# Patient Record
Sex: FEMALE | Race: BLACK OR AFRICAN AMERICAN | NOT HISPANIC OR LATINO
[De-identification: names, ages, dates, MRNs, and addresses within clinical notes are randomized per-mention and may not be internally consistent; named-entity substitution may affect disease eponyms.]

---

## 2024-02-06 ENCOUNTER — ASOB RESULT (OUTPATIENT)
Age: 29
End: 2024-02-06

## 2024-02-06 ENCOUNTER — APPOINTMENT (OUTPATIENT)
Dept: ANTEPARTUM | Facility: CLINIC | Age: 29
End: 2024-02-06
Payer: COMMERCIAL

## 2024-02-06 PROCEDURE — 76805 OB US >/= 14 WKS SNGL FETUS: CPT

## 2024-04-05 ENCOUNTER — INPATIENT (INPATIENT)
Facility: HOSPITAL | Age: 29
LOS: 1 days | Discharge: HOME | End: 2024-04-07
Attending: OBSTETRICS & GYNECOLOGY | Admitting: SPECIALIST
Payer: COMMERCIAL

## 2024-04-05 ENCOUNTER — APPOINTMENT (OUTPATIENT)
Dept: ANTEPARTUM | Facility: CLINIC | Age: 29
End: 2024-04-05

## 2024-04-05 VITALS — RESPIRATION RATE: 16 BRPM | TEMPERATURE: 99 F

## 2024-04-05 DIAGNOSIS — O26.899 OTHER SPECIFIED PREGNANCY RELATED CONDITIONS, UNSPECIFIED TRIMESTER: ICD-10-CM

## 2024-04-05 LAB
BLD GP AB SCN SERPL QL: NEGATIVE — SIGNIFICANT CHANGE UP
HCT VFR BLD CALC: 34.5 % — SIGNIFICANT CHANGE UP (ref 34.5–45)
HGB BLD-MCNC: 11 G/DL — LOW (ref 11.5–15.5)
IANC: 24.01 K/UL — HIGH (ref 1.8–7.4)
MCHC RBC-ENTMCNC: 26.8 PG — LOW (ref 27–34)
MCHC RBC-ENTMCNC: 31.9 GM/DL — LOW (ref 32–36)
MCV RBC AUTO: 83.9 FL — SIGNIFICANT CHANGE UP (ref 80–100)
PLATELET # BLD AUTO: 200 K/UL — SIGNIFICANT CHANGE UP (ref 150–400)
RBC # BLD: 4.11 M/UL — SIGNIFICANT CHANGE UP (ref 3.8–5.2)
RBC # FLD: 15.1 % — HIGH (ref 10.3–14.5)
RH IG SCN BLD-IMP: NEGATIVE — SIGNIFICANT CHANGE UP
RH IG SCN BLD-IMP: NEGATIVE — SIGNIFICANT CHANGE UP
WBC # BLD: 26.96 K/UL — HIGH (ref 3.8–10.5)
WBC # FLD AUTO: 26.96 K/UL — HIGH (ref 3.8–10.5)

## 2024-04-05 PROCEDURE — 88307 TISSUE EXAM BY PATHOLOGIST: CPT | Mod: 26

## 2024-04-05 RX ORDER — INFLUENZA VIRUS VACCINE 15; 15; 15; 15 UG/.5ML; UG/.5ML; UG/.5ML; UG/.5ML
0.5 SUSPENSION INTRAMUSCULAR ONCE
Refills: 0 | Status: DISCONTINUED | OUTPATIENT
Start: 2024-04-05 | End: 2024-04-07

## 2024-04-05 RX ORDER — AMPICILLIN TRIHYDRATE 250 MG
2 CAPSULE ORAL ONCE
Refills: 0 | Status: DISCONTINUED | OUTPATIENT
Start: 2024-04-05 | End: 2024-04-06

## 2024-04-05 RX ORDER — AMPICILLIN TRIHYDRATE 250 MG
1 CAPSULE ORAL EVERY 4 HOURS
Refills: 0 | Status: DISCONTINUED | OUTPATIENT
Start: 2024-04-05 | End: 2024-04-06

## 2024-04-05 RX ORDER — CHLORHEXIDINE GLUCONATE 213 G/1000ML
1 SOLUTION TOPICAL DAILY
Refills: 0 | Status: DISCONTINUED | OUTPATIENT
Start: 2024-04-05 | End: 2024-04-06

## 2024-04-05 RX ORDER — SODIUM CHLORIDE 9 MG/ML
1000 INJECTION, SOLUTION INTRAVENOUS
Refills: 0 | Status: DISCONTINUED | OUTPATIENT
Start: 2024-04-05 | End: 2024-04-06

## 2024-04-05 RX ORDER — OXYTOCIN 10 UNIT/ML
333.33 VIAL (ML) INJECTION
Qty: 20 | Refills: 0 | Status: DISCONTINUED | OUTPATIENT
Start: 2024-04-05 | End: 2024-04-07

## 2024-04-05 NOTE — OB PROVIDER H&P - NSHPPHYSICALEXAM_GEN_ALL_CORE
Vital Signs Last 24 Hrs  T(C): 37.0 (05 Apr 2024 22:13), Max: 37.0 (05 Apr 2024 22:13)  T(F): 98.6 (05 Apr 2024 22:13), Max: 98.6 (05 Apr 2024 22:13)  HR: 100 (05 Apr 2024 22:16) (100 - 100)  BP: 139/75 (05 Apr 2024 22:16) (139/75 - 139/75)  BP(mean): --  RR: 16 (05 Apr 2024 22:13) (16 - 16)  SpO2: --        Physical Exam:  Gen: NAD, AxOx3  CV: RRR  Resp: CTAB  Abd: soft, NT, gravid        SVE: 10/100/0  FHT: 145 mod sharlene/-accels/-decels   [place]: q 2-3 min  EFW: 3200g  Sono: Cephalic

## 2024-04-05 NOTE — OB PROVIDER H&P - NSLASTDATEVISIT_OBGYN_ALL_OB
PAST SURGICAL HISTORY:  History of cholecystectomy     History of colostomy reversal 8 cm removed 1987 s/p  complication    History of hysterectomy , s/p      Unknown

## 2024-04-05 NOTE — OB PROVIDER H&P - ATTENDING COMMENTS
Pt unknown to me prior to presenting to L+D.  Pt presented to L+D fully dilated.  I met pt in LDR.  Pt and partner state water broke ~12:50pm pt states she wanted to deliver at home.  States she was in birthing tub and had  at home.  Partner states she was "pushing" at home for a "few hours" before she came to the hospital.  Pt states she came to the hospital because she could not take the pain anymore.  Pt accompanied by " in training", partner and mother.  Upon admission pt told triage provider that she was GBS negative.  A few minutes before delivery pt's mother presented an envelope with labs to RN.  GBS was noted to be positive at that time.  Pt was informed of positive GBS status.  Ampicillin was immediately ordered, however, no ampicillin was actually received prior to delivery.  See delivery summary for details.          Cora Fan MD

## 2024-04-05 NOTE — OB PROVIDER H&P - HISTORY OF PRESENT ILLNESS
HPI: Pt is a 29yo  @40.2, TAMICA: 4/3/24, who presents with painful contractions, 10/10, every 3 minutes since 8am. Patients states her water broke (clear) at 1pm today. Endorses positive fetal movement. Denies VB. Rh negative, received Rhogam @28wks.   GBS negative per patient  PNC with [facility] [facility]  EFW: 3200g

## 2024-04-05 NOTE — OB PROVIDER H&P - PRETERM DELIVERIES, OB PROFILE
28yo  @ 35. presents sent from office for evaluation for  0 30yo  @ 35.6 presents sent from office for evaluation for elevated fingersticks. Reports fasting finger sticks have been  for past 3 mornings. Today reports pp fingerstick of 142. Reports having episode of double vision and feeling dizzy at 1500 today. Last meal was at 1230. On arrival to triage BP noted to be 153/104 in [facility]. Denies elevated BP's this pregnancy.  Denies HA, N/V, epigastric pain.   Denies history of COVID-19 or recent exposure.     H/O 2018 Bilateral Sx for lazy eye  Obesity  "Boarderline diabetes" before pregnancy

## 2024-04-05 NOTE — OB PROVIDER H&P - ASSESSMENT
A/P: Pt is a 27yo  @40.2, TAMICA: 4/3/24, who presents in active labor and SROM since 1pm today clear.  - PNC uncomplicated per patient     1. Admit to LND. Routine Labs. IVF  2. Expectant Management   3. Fetus: Cat 1 tracing, Vertex, EFW 3200g . C/w EFM.  4. GBS negative per patient   5. Pain: Declines epidural     VANESSA Vasquez  Discussed with Dr. Fan

## 2024-04-05 NOTE — OB NEONATOLOGY/PEDIATRICIAN DELIVERY SUMMARY - NSPEDSNEONOTESA_OBGYN_ALL_OB_FT
Baby is a 40.2 wk AGA female born to a 29 y/o  mother via . PEDS called to delivery for abnormal FHR tracing, and shoulder dystocia. Maternal history TOPx3. Maternal blood type O-. PNL unknown, mom's WBC 26.96, GBS+ at unknown time. SROM at 1300 on , clear fluids. Warmed, dried, stimulated. Apgars 9/9. EOS 2.74. Mom plans to breastfeed and declines hepB. Highest maternal temp: 38.5.   BW: 3770  : 4/6  TOB: 23:55    Physical Exam:  Gen: NAD, +grimace  HEENT: molding with possible cephalohematoma, ears normal set, no ear pits or tags. nares clinically patent  Resp: no increased work of breathing, good air entry b/l  Cardio: Normal S1/S2, regular rate and rhythm, no murmurs, rubs or gallops  Abd: soft, non tender, non distended, umbilical cord with 3 vessels  Neuro: +grasp/suck/leila, normal tone  Extremities: moving all extremities, full range of motion x 4, no crepitus  Skin: pink, warm  Genitals: Matteo 1, anus patent

## 2024-04-05 NOTE — OB PROVIDER H&P - NSLOWPPHRISK_OBGYN_A_OB
No previous uterine incision/[place] Pregnancy/Less than or equal to 4 previous vaginal births/No known bleeding disorder/No history of postpartum hemorrhage/No other PPH risks indicated

## 2024-04-06 LAB
ANISOCYTOSIS BLD QL: SLIGHT — SIGNIFICANT CHANGE UP
BASOPHILS # BLD AUTO: 0 K/UL — SIGNIFICANT CHANGE UP (ref 0–0.2)
BASOPHILS # BLD AUTO: 0 K/UL — SIGNIFICANT CHANGE UP (ref 0–0.2)
BASOPHILS NFR BLD AUTO: 0 % — SIGNIFICANT CHANGE UP (ref 0–2)
BASOPHILS NFR BLD AUTO: 0 % — SIGNIFICANT CHANGE UP (ref 0–2)
EOSINOPHIL # BLD AUTO: 0 K/UL — SIGNIFICANT CHANGE UP (ref 0–0.5)
EOSINOPHIL # BLD AUTO: 0.22 K/UL — SIGNIFICANT CHANGE UP (ref 0–0.5)
EOSINOPHIL NFR BLD AUTO: 0 % — SIGNIFICANT CHANGE UP (ref 0–6)
EOSINOPHIL NFR BLD AUTO: 0.8 % — SIGNIFICANT CHANGE UP (ref 0–6)
GIANT PLATELETS BLD QL SMEAR: PRESENT — SIGNIFICANT CHANGE UP
GIANT PLATELETS BLD QL SMEAR: PRESENT — SIGNIFICANT CHANGE UP
HBV SURFACE AG SERPL QL IA: SIGNIFICANT CHANGE UP
HCT VFR BLD CALC: 30.9 % — LOW (ref 34.5–45)
HGB BLD-MCNC: 10 G/DL — LOW (ref 11.5–15.5)
HIV 1+2 AB+HIV1 P24 AG SERPL QL IA: SIGNIFICANT CHANGE UP
IANC: 24.41 K/UL — HIGH (ref 1.8–7.4)
KLEIHAUER-BETKE CALCULATION: 0.02 % — SIGNIFICANT CHANGE UP (ref 0–0.2)
LYMPHOCYTES # BLD AUTO: 0.25 K/UL — LOW (ref 1–3.3)
LYMPHOCYTES # BLD AUTO: 0.9 % — LOW (ref 13–44)
LYMPHOCYTES # BLD AUTO: 1.4 K/UL — SIGNIFICANT CHANGE UP (ref 1–3.3)
LYMPHOCYTES # BLD AUTO: 5.2 % — LOW (ref 13–44)
MANUAL SMEAR VERIFICATION: SIGNIFICANT CHANGE UP
MCHC RBC-ENTMCNC: 27 PG — SIGNIFICANT CHANGE UP (ref 27–34)
MCHC RBC-ENTMCNC: 32.4 GM/DL — SIGNIFICANT CHANGE UP (ref 32–36)
MCV RBC AUTO: 83.5 FL — SIGNIFICANT CHANGE UP (ref 80–100)
MONOCYTES # BLD AUTO: 1.64 K/UL — HIGH (ref 0–0.9)
MONOCYTES # BLD AUTO: 2.2 K/UL — HIGH (ref 0–0.9)
MONOCYTES NFR BLD AUTO: 6.1 % — SIGNIFICANT CHANGE UP (ref 2–14)
MONOCYTES NFR BLD AUTO: 7.9 % — SIGNIFICANT CHANGE UP (ref 2–14)
MYELOCYTES NFR BLD: 0.9 % — HIGH (ref 0–0)
NEUTROPHILS # BLD AUTO: 23.21 K/UL — HIGH (ref 1.8–7.4)
NEUTROPHILS # BLD AUTO: 25.38 K/UL — HIGH (ref 1.8–7.4)
NEUTROPHILS NFR BLD AUTO: 85.2 % — HIGH (ref 43–77)
NEUTROPHILS NFR BLD AUTO: 89.5 % — HIGH (ref 43–77)
NEUTS BAND # BLD: 0.9 % — SIGNIFICANT CHANGE UP (ref 0–6)
NEUTS BAND # BLD: 1.7 % — SIGNIFICANT CHANGE UP (ref 0–6)
PLAT MORPH BLD: NORMAL — SIGNIFICANT CHANGE UP
PLAT MORPH BLD: NORMAL — SIGNIFICANT CHANGE UP
PLATELET # BLD AUTO: 173 K/UL — SIGNIFICANT CHANGE UP (ref 150–400)
PLATELET COUNT - ESTIMATE: NORMAL — SIGNIFICANT CHANGE UP
PLATELET COUNT - ESTIMATE: NORMAL — SIGNIFICANT CHANGE UP
POLYCHROMASIA BLD QL SMEAR: SLIGHT — SIGNIFICANT CHANGE UP
RBC # BLD: 3.7 M/UL — LOW (ref 3.8–5.2)
RBC # FLD: 15.2 % — HIGH (ref 10.3–14.5)
RBC BLD AUTO: NORMAL — SIGNIFICANT CHANGE UP
RBC BLD AUTO: NORMAL — SIGNIFICANT CHANGE UP
RUBV IGG SER-ACNC: 2 INDEX — SIGNIFICANT CHANGE UP
RUBV IGG SER-IMP: POSITIVE — SIGNIFICANT CHANGE UP
T PALLIDUM AB TITR SER: NEGATIVE — SIGNIFICANT CHANGE UP
VARIANT LYMPHS # BLD: 0.9 % — SIGNIFICANT CHANGE UP (ref 0–6)
WBC # BLD: 27.83 K/UL — HIGH (ref 3.8–10.5)
WBC # FLD AUTO: 27.83 K/UL — HIGH (ref 3.8–10.5)

## 2024-04-06 RX ORDER — OXYCODONE HYDROCHLORIDE 5 MG/1
5 TABLET ORAL
Refills: 0 | Status: DISCONTINUED | OUTPATIENT
Start: 2024-04-06 | End: 2024-04-07

## 2024-04-06 RX ORDER — PIPERACILLIN AND TAZOBACTAM 4; .5 G/20ML; G/20ML
4.5 INJECTION, POWDER, LYOPHILIZED, FOR SOLUTION INTRAVENOUS ONCE
Refills: 0 | Status: COMPLETED | OUTPATIENT
Start: 2024-04-06 | End: 2024-04-06

## 2024-04-06 RX ORDER — KETOROLAC TROMETHAMINE 30 MG/ML
30 SYRINGE (ML) INJECTION ONCE
Refills: 0 | Status: DISCONTINUED | OUTPATIENT
Start: 2024-04-06 | End: 2024-04-07

## 2024-04-06 RX ORDER — MAGNESIUM HYDROXIDE 400 MG/1
30 TABLET, CHEWABLE ORAL
Refills: 0 | Status: DISCONTINUED | OUTPATIENT
Start: 2024-04-06 | End: 2024-04-07

## 2024-04-06 RX ORDER — OXYTOCIN 10 UNIT/ML
41.67 VIAL (ML) INJECTION
Qty: 20 | Refills: 0 | Status: DISCONTINUED | OUTPATIENT
Start: 2024-04-06 | End: 2024-04-07

## 2024-04-06 RX ORDER — MORPHINE SULFATE 50 MG/1
4 CAPSULE, EXTENDED RELEASE ORAL ONCE
Refills: 0 | Status: DISCONTINUED | OUTPATIENT
Start: 2024-04-06 | End: 2024-04-06

## 2024-04-06 RX ORDER — OXYCODONE HYDROCHLORIDE 5 MG/1
5 TABLET ORAL ONCE
Refills: 0 | Status: DISCONTINUED | OUTPATIENT
Start: 2024-04-06 | End: 2024-04-07

## 2024-04-06 RX ORDER — PIPERACILLIN AND TAZOBACTAM 4; .5 G/20ML; G/20ML
4.5 INJECTION, POWDER, LYOPHILIZED, FOR SOLUTION INTRAVENOUS EVERY 8 HOURS
Refills: 0 | Status: DISCONTINUED | OUTPATIENT
Start: 2024-04-06 | End: 2024-04-06

## 2024-04-06 RX ORDER — BENZOCAINE 10 %
1 GEL (GRAM) MUCOUS MEMBRANE EVERY 6 HOURS
Refills: 0 | Status: DISCONTINUED | OUTPATIENT
Start: 2024-04-06 | End: 2024-04-07

## 2024-04-06 RX ORDER — SODIUM CHLORIDE 9 MG/ML
1000 INJECTION, SOLUTION INTRAVENOUS ONCE
Refills: 0 | Status: COMPLETED | OUTPATIENT
Start: 2024-04-06 | End: 2024-04-06

## 2024-04-06 RX ORDER — AER TRAVELER 0.5 G/1
1 SOLUTION RECTAL; TOPICAL EVERY 4 HOURS
Refills: 0 | Status: DISCONTINUED | OUTPATIENT
Start: 2024-04-06 | End: 2024-04-07

## 2024-04-06 RX ORDER — DIPHENHYDRAMINE HCL 50 MG
25 CAPSULE ORAL EVERY 6 HOURS
Refills: 0 | Status: DISCONTINUED | OUTPATIENT
Start: 2024-04-06 | End: 2024-04-07

## 2024-04-06 RX ORDER — HYDROCORTISONE 1 %
1 OINTMENT (GRAM) TOPICAL EVERY 6 HOURS
Refills: 0 | Status: DISCONTINUED | OUTPATIENT
Start: 2024-04-06 | End: 2024-04-07

## 2024-04-06 RX ORDER — SODIUM CHLORIDE 9 MG/ML
3 INJECTION INTRAMUSCULAR; INTRAVENOUS; SUBCUTANEOUS EVERY 8 HOURS
Refills: 0 | Status: DISCONTINUED | OUTPATIENT
Start: 2024-04-06 | End: 2024-04-07

## 2024-04-06 RX ORDER — ACETAMINOPHEN 500 MG
975 TABLET ORAL
Refills: 0 | Status: DISCONTINUED | OUTPATIENT
Start: 2024-04-06 | End: 2024-04-07

## 2024-04-06 RX ORDER — SIMETHICONE 80 MG/1
80 TABLET, CHEWABLE ORAL EVERY 4 HOURS
Refills: 0 | Status: DISCONTINUED | OUTPATIENT
Start: 2024-04-06 | End: 2024-04-07

## 2024-04-06 RX ORDER — IBUPROFEN 200 MG
600 TABLET ORAL EVERY 6 HOURS
Refills: 0 | Status: COMPLETED | OUTPATIENT
Start: 2024-04-06 | End: 2025-03-05

## 2024-04-06 RX ORDER — DIBUCAINE 1 %
1 OINTMENT (GRAM) RECTAL EVERY 6 HOURS
Refills: 0 | Status: DISCONTINUED | OUTPATIENT
Start: 2024-04-06 | End: 2024-04-07

## 2024-04-06 RX ORDER — TETANUS TOXOID, REDUCED DIPHTHERIA TOXOID AND ACELLULAR PERTUSSIS VACCINE, ADSORBED 5; 2.5; 8; 8; 2.5 [IU]/.5ML; [IU]/.5ML; UG/.5ML; UG/.5ML; UG/.5ML
0.5 SUSPENSION INTRAMUSCULAR ONCE
Refills: 0 | Status: DISCONTINUED | OUTPATIENT
Start: 2024-04-06 | End: 2024-04-07

## 2024-04-06 RX ORDER — LANOLIN
1 OINTMENT (GRAM) TOPICAL EVERY 6 HOURS
Refills: 0 | Status: DISCONTINUED | OUTPATIENT
Start: 2024-04-06 | End: 2024-04-07

## 2024-04-06 RX ORDER — PIPERACILLIN AND TAZOBACTAM 4; .5 G/20ML; G/20ML
4.5 INJECTION, POWDER, LYOPHILIZED, FOR SOLUTION INTRAVENOUS EVERY 8 HOURS
Refills: 0 | Status: DISCONTINUED | OUTPATIENT
Start: 2024-04-06 | End: 2024-04-07

## 2024-04-06 RX ORDER — ACETAMINOPHEN 500 MG
1000 TABLET ORAL ONCE
Refills: 0 | Status: COMPLETED | OUTPATIENT
Start: 2024-04-06 | End: 2024-04-06

## 2024-04-06 RX ORDER — OXYTOCIN 10 UNIT/ML
10 VIAL (ML) INJECTION ONCE
Refills: 0 | Status: DISCONTINUED | OUTPATIENT
Start: 2024-04-06 | End: 2024-04-07

## 2024-04-06 RX ORDER — IBUPROFEN 200 MG
600 TABLET ORAL EVERY 6 HOURS
Refills: 0 | Status: DISCONTINUED | OUTPATIENT
Start: 2024-04-06 | End: 2024-04-07

## 2024-04-06 RX ORDER — PRAMOXINE HYDROCHLORIDE 150 MG/15G
1 AEROSOL, FOAM RECTAL EVERY 4 HOURS
Refills: 0 | Status: DISCONTINUED | OUTPATIENT
Start: 2024-04-06 | End: 2024-04-07

## 2024-04-06 RX ADMIN — PIPERACILLIN AND TAZOBACTAM 200 GRAM(S): 4; .5 INJECTION, POWDER, LYOPHILIZED, FOR SOLUTION INTRAVENOUS at 17:52

## 2024-04-06 RX ADMIN — Medication 600 MILLIGRAM(S): at 18:43

## 2024-04-06 RX ADMIN — Medication 1 TABLET(S): at 11:27

## 2024-04-06 RX ADMIN — SODIUM CHLORIDE 3 MILLILITER(S): 9 INJECTION INTRAMUSCULAR; INTRAVENOUS; SUBCUTANEOUS at 05:19

## 2024-04-06 RX ADMIN — Medication 600 MILLIGRAM(S): at 17:53

## 2024-04-06 RX ADMIN — Medication 600 MILLIGRAM(S): at 11:27

## 2024-04-06 RX ADMIN — Medication 400 MILLIGRAM(S): at 01:00

## 2024-04-06 RX ADMIN — PIPERACILLIN AND TAZOBACTAM 200 GRAM(S): 4; .5 INJECTION, POWDER, LYOPHILIZED, FOR SOLUTION INTRAVENOUS at 10:06

## 2024-04-06 RX ADMIN — PIPERACILLIN AND TAZOBACTAM 200 GRAM(S): 4; .5 INJECTION, POWDER, LYOPHILIZED, FOR SOLUTION INTRAVENOUS at 01:00

## 2024-04-06 RX ADMIN — SODIUM CHLORIDE 3 MILLILITER(S): 9 INJECTION INTRAMUSCULAR; INTRAVENOUS; SUBCUTANEOUS at 22:27

## 2024-04-06 RX ADMIN — SODIUM CHLORIDE 2000 MILLILITER(S): 9 INJECTION, SOLUTION INTRAVENOUS at 01:00

## 2024-04-06 RX ADMIN — Medication 975 MILLIGRAM(S): at 15:27

## 2024-04-06 RX ADMIN — Medication 975 MILLIGRAM(S): at 16:20

## 2024-04-06 RX ADMIN — SODIUM CHLORIDE 3 MILLILITER(S): 9 INJECTION INTRAMUSCULAR; INTRAVENOUS; SUBCUTANEOUS at 14:00

## 2024-04-06 RX ADMIN — Medication 600 MILLIGRAM(S): at 23:41

## 2024-04-06 RX ADMIN — Medication 600 MILLIGRAM(S): at 12:20

## 2024-04-06 NOTE — CHART NOTE - NSCHARTNOTEFT_GEN_A_CORE
R4 Chart Note     Patient presented to triage at fully dilated with membranes ruptured at 1p per patient and support people at bedside. Patient was admitted and brought to L&D and began pushing. Patient reported to triage team that she was GBS negative. At this time, patient did not present any records. While pushing, patient showed RN records from her prenatal care showing a test positive for Strep agalactiae (a group B strep spp). At this time (appxo 1130p) records were reviewed by KRISS and MD at bedside and Ampicillin was ordered and hung for GBS ppx. As delivery occurred shortly after, patient did not receive full dose of abx prior to delivery.     NICU was notified of this fact as well as maternal fever and leukocytosis.     Sunita Eric MD PGY4   d/w Dr. Fan

## 2024-04-06 NOTE — PROGRESS NOTE ADULT - SUBJECTIVE AND OBJECTIVE BOX
OB Progress Note:  PPD#1    S: 27yo  PPD#1 s/p  c/b endometritis. Patient feels well. Pain is well controlled, tolerating regular diet, passing flatus, voiding spontaneously, ambulating without difficulty. Denies heavy vaginal bleeding, CP/SOB, N/V, lightheadedness/dizziness.     O:  Vitals:  Vital Signs Last 24 Hrs  T(C): 37.3 (2024 05:30), Max: 38.5 (2024 00:07)  T(F): 99.1 (2024 05:30), Max: 101.3 (2024 00:07)  HR: 86 (2024 05:30) (84 - 129)  BP: 139/91 (2024 05:30) (127/62 - 165/78)  BP(mean): --  RR: 18 (2024 05:30) (16 - 18)  SpO2: 100% (2024 05:30) (87% - 100%)    Parameters below as of 2024 05:30  Patient On (Oxygen Delivery Method): room air        MEDICATIONS  (STANDING):  acetaminophen     Tablet .. 975 milliGRAM(s) Oral <User Schedule>  diphtheria/tetanus/pertussis (acellular) Vaccine (Adacel) 0.5 milliLiter(s) IntraMuscular once  ibuprofen  Tablet. 600 milliGRAM(s) Oral every 6 hours  influenza   Vaccine 0.5 milliLiter(s) IntraMuscular once  ketorolac   Injectable 30 milliGRAM(s) IV Push once  misoprostol 1000 MICROGram(s) Rectal once  oxytocin Infusion 41.667 milliUNIT(s)/Min (125 mL/Hr) IV Continuous <Continuous>  oxytocin Infusion 333.333 milliUNIT(s)/Min (1000 mL/Hr) IV Continuous <Continuous>  oxytocin Injectable 10 Unit(s) IntraMuscular once  piperacillin/tazobactam IVPB.. 4.5 Gram(s) IV Intermittent every 8 hours  prenatal multivitamin 1 Tablet(s) Oral daily  sodium chloride 0.9% lock flush 3 milliLiter(s) IV Push every 8 hours      Labs:  Blood type: O Negative  Rubella IgG: RPR:                           10.0<L>   27.83<H> >-----------< 173    (  @ 05:15 )             30.9<L>                        11.0<L>   26.96<H> >-----------< 200    (  @ 22:29 )             34.5                  Physical Exam:  General: NAD  CV: RRR  Resp: CTAB  Abdomen: soft, non-tender, 0/10 uterine tenderness, non-distended, fundus firm  : Nl lochia  Extremities: No erythema/edema

## 2024-04-06 NOTE — OB RN DELIVERY SUMMARY - NSMECDELIVBABYA_OBGYN_ALL_OB
Labs reviewed by Keyshawn Brooks level 5.8 at 0650. No changes to medications regimen. Repeat labs with clinic visit 6/14.     Called patient and provided above instructions. Patient verbalized understanding.    yes

## 2024-04-06 NOTE — OB PROVIDER DELIVERY SUMMARY - NSPROCMANEUVERSA_OBGYN_ALL_OB
Suprapubic pressure/Patito (Legs flexed back)/Delivery of posterior arm/Fundal pressure NOT applied/Other

## 2024-04-06 NOTE — OB RN DELIVERY SUMMARY - NSSELHIDDEN_OBGYN_ALL_OB_FT
[NS_DeliveryAttending1_OBGYN_ALL_OB_FT:[id number]],[NS_DeliveryAssist1_OBGYN_ALL_OB_FT:[id number]],[NS_DeliveryRN_OBGYN_ALL_OB_FT:[id number]=]

## 2024-04-06 NOTE — OB RN DELIVERY SUMMARY - NS_SEPSISRSKCALC_OBGYN_ALL_OB_FT
EOS calculated successfully. EOS Risk Factor: 0.5/1000 live births ([facility] national incidence); GA=40w2d; Temp=101.3; ROM=10.917; GBS='Unknown'; Antibiotics='No antibiotics or any antibiotics < 2 hrs prior to birth'   EOS calculated successfully. EOS Risk Factor: 0.5/1000 live births ([facility] national incidence); GA=40w2d; Temp=101.3; ROM=10.917; GBS='Positive'; Antibiotics='No antibiotics or any antibiotics < 2 hrs prior to birth'

## 2024-04-06 NOTE — PROVIDER CONTACT NOTE (OTHER) - BACKGROUND
s/p  @0265. shoulder dystocia. 2nd degree lac, qbl 201. PP temp 38.2 orally. s/p 1L LR, 1g IV tylenol, 4.5 g IV zosyn

## 2024-04-06 NOTE — OB PROVIDER DELIVERY SUMMARY - NSPROVIDERDELIVERYNOTE_OBGYN_ALL_OB_FT
Vaginal delivery of liveborn female infant from KODI position. Shoulder dystocia identified with L shoulder anterior. Patito and suprapubic applied. Menticoglu maneuver facilitated delivery of the posterior arm and anterior shoulder followed after <1min. Cord immediately clamped and cut and infant passed to peds, present for Cat 2 FHR. Placenta delivered spontaneously, intact. Intermittent uterine atony present on exam, pitocin 10mU and cytotec 1000mcg PA given with improvement in tone and bleeding. 2nd degree laceration repaired with 2-0 chromic, excellent hemostasis.   Following delivery, patient febrile to 38.5 with leukocytosis. Will treat for endometritis with Zosyn x24.     Sunita Eric MD PGY4   w/ Dr. Fan and Monty MONTERROSO Vaginal delivery of liveborn female infant from KODI position. Shoulder dystocia identified with L shoulder anterior. Patito and suprapubic applied. Menticoglu maneuver facilitated delivery of the posterior arm and anterior shoulder followed after <1min. Cord immediately clamped and cut and infant passed to peds, present for Cat 2 FHR. Placenta delivered spontaneously, intact. Intermittent uterine atony present on exam, pitocin 10mU and cytotec 1000mcg LA given with improvement in tone and bleeding. 2nd degree laceration repaired with 2-0 chromic, excellent hemostasis.   Following delivery, patient febrile to 38.5 with leukocytosis.  Pt was noted to be GBS + minutes before delivery.  Ampicillin was ordered and hung but pt did not receive any antibiotics prior to delivery.  WBC on admission 26.  Decision was made to treat for endometritis with Zosyn x24.     Sunita Eric MD PGY4   w/ Dr. Fan and Monty MONTERROSO

## 2024-04-06 NOTE — OB PROVIDER DELIVERY SUMMARY - NSSELHIDDEN_OBGYN_ALL_OB_FT
[NS_DeliveryAttending1_OBGYN_ALL_OB_FT:[id number]],[NS_DeliveryAssist1_OBGYN_ALL_OB_FT:[id number]],[NS_DeliveryRN_OBGYN_ALL_OB_FT:[id number]=],[NS_DeliveryAssist2_OBGYN_ALL_OB_FT:[id number]=]

## 2024-04-06 NOTE — PROGRESS NOTE ADULT - ASSESSMENT
A/P: 27yo PPD#1 s/p  c/b endometritis.  Patient is stable and doing well post-partum.   - Pain well controlled, continue current pain regimen  - Increase ambulation, SCDs when not ambulating  - Continue regular diet    Endometritis  - T38.5  @ 00:007, on Zosyn  - Continue Zosyn for 24 hours  - WBC 26.9->27.8, will f/u   - Pt with nontender uterus on exam  - Will continue to monitor    Dameon Rome MD PGY1 A/P: 29yo PPD#1 s/p  c/b endometritis.  Patient is stable and doing well post-partum.   - Pain well controlled, continue current pain regimen  - Increase ambulation, SCDs when not ambulating  - Continue regular diet    Endometritis  - T38.5  @ 00:007, on Zosyn  - Continue Zosyn for 24 hours  - WBC 26.9->27.8, will f/u   - Pt with nontender uterus on exam  - Will continue to monitor    Dameon Rome MD PGY1    Attending note   patient seen and evaluated   Endometritis- treatment with Zosyn   uterine fundus significantly decreased tenderness 1/10  WBC elevated to recheck in am   continue current pp care and treatment    C Darinel

## 2024-04-07 ENCOUNTER — TRANSCRIPTION ENCOUNTER (OUTPATIENT)
Age: 29
End: 2024-04-07

## 2024-04-07 VITALS
SYSTOLIC BLOOD PRESSURE: 139 MMHG | DIASTOLIC BLOOD PRESSURE: 80 MMHG | HEART RATE: 87 BPM | TEMPERATURE: 98 F | OXYGEN SATURATION: 100 % | RESPIRATION RATE: 18 BRPM

## 2024-04-07 LAB
HCT VFR BLD CALC: 28.6 % — LOW (ref 34.5–45)
HGB BLD-MCNC: 8.9 G/DL — LOW (ref 11.5–15.5)
MCHC RBC-ENTMCNC: 26.3 PG — LOW (ref 27–34)
MCHC RBC-ENTMCNC: 31.1 GM/DL — LOW (ref 32–36)
MCV RBC AUTO: 84.4 FL — SIGNIFICANT CHANGE UP (ref 80–100)
NRBC # BLD: 0 /100 WBCS — SIGNIFICANT CHANGE UP (ref 0–0)
NRBC # FLD: 0 K/UL — SIGNIFICANT CHANGE UP (ref 0–0)
PLATELET # BLD AUTO: 170 K/UL — SIGNIFICANT CHANGE UP (ref 150–400)
RBC # BLD: 3.39 M/UL — LOW (ref 3.8–5.2)
RBC # FLD: 15.8 % — HIGH (ref 10.3–14.5)
WBC # BLD: 17.38 K/UL — HIGH (ref 3.8–10.5)
WBC # FLD AUTO: 17.38 K/UL — HIGH (ref 3.8–10.5)

## 2024-04-07 RX ORDER — ACETAMINOPHEN 500 MG
3 TABLET ORAL
Qty: 0 | Refills: 0 | DISCHARGE
Start: 2024-04-07

## 2024-04-07 RX ORDER — IBUPROFEN 200 MG
1 TABLET ORAL
Qty: 0 | Refills: 0 | DISCHARGE
Start: 2024-04-07

## 2024-04-07 RX ADMIN — Medication 975 MILLIGRAM(S): at 09:30

## 2024-04-07 RX ADMIN — Medication 600 MILLIGRAM(S): at 12:15

## 2024-04-07 RX ADMIN — Medication 975 MILLIGRAM(S): at 16:00

## 2024-04-07 RX ADMIN — SODIUM CHLORIDE 3 MILLILITER(S): 9 INJECTION INTRAMUSCULAR; INTRAVENOUS; SUBCUTANEOUS at 05:06

## 2024-04-07 RX ADMIN — Medication 975 MILLIGRAM(S): at 15:15

## 2024-04-07 RX ADMIN — Medication 600 MILLIGRAM(S): at 11:31

## 2024-04-07 RX ADMIN — Medication 1 TABLET(S): at 11:31

## 2024-04-07 RX ADMIN — Medication 975 MILLIGRAM(S): at 08:51

## 2024-04-07 RX ADMIN — Medication 600 MILLIGRAM(S): at 00:11

## 2024-04-07 NOTE — DISCHARGE NOTE OB - CARE PLAN
Principal Discharge DX:	Normal spontaneous vaginal delivery  Assessment and plan of treatment:	Make your follow-up appointment with your doctor in 6 weeks for a post-partum check. No heavy lifting, driving, or strenuous activity for 6 weeks. Nothing per vagina such as tampons, intercourse, douches, or tub baths for 6 weeks or until you see your doctor. Call your doctor with any signs and symptoms of infection such as fever, chills, nausea, or vomiting. Call your doctor if you're unable to tolerate food, increase in vaginal bleeding, or have difficulty urinating. Call your doctor if you have pain that is not relieved by your prescribed medications. Notify your doctor with any other concerns.   Call   if you have any of these concerns in the next 6 weeks.   1

## 2024-04-07 NOTE — DISCHARGE NOTE OB - MATERIALS PROVIDED
Vaccinations/[facility]  Screening Program/  Immunization Record/Breastfeeding Log/Breastfeeding Mother’s Support Group Information/Guide to Postpartum Care/[facility] Hearing Screen Program/Back To Sleep Handout/Shaken Baby Prevention Handout/Breastfeeding Guide and Packet/Discharge Medication Information for Patients and Families Pocket Guide

## 2024-04-07 NOTE — DISCHARGE NOTE OB - NS MD DC FALL RISK RISK
For information on Fall & Injury Prevention, visit: [email or web address] OR  [email or web address] OR  https://www.cdc.gov/steadi/patient.html

## 2024-04-07 NOTE — DISCHARGE NOTE OB - PLAN OF CARE
Make your follow-up appointment with your doctor in 6 weeks for a post-partum check. No heavy lifting, driving, or strenuous activity for 6 weeks. Nothing per vagina such as tampons, intercourse, douches, or tub baths for 6 weeks or until you see your doctor. Call your doctor with any signs and symptoms of infection such as fever, chills, nausea, or vomiting. Call your doctor if you're unable to tolerate food, increase in vaginal bleeding, or have difficulty urinating. Call your doctor if you have pain that is not relieved by your prescribed medications. Notify your doctor with any other concerns.   Call   if you have any of these concerns in the next 6 weeks.

## 2024-04-07 NOTE — DISCHARGE NOTE OB - CARE PROVIDER_API CALL
Elvia Quick  Obstetrics and Gynecology  [address] [place] [address]  Phone: [phone]  Fax: [phone]  Established Patient  Follow Up Time:

## 2024-04-07 NOTE — PROGRESS NOTE ADULT - SUBJECTIVE AND OBJECTIVE BOX
OB Progress Note:  PPD#2    S: 29yo PPD#2 s/p  c/b endometritis.. Patient feels well. Pain is well controlled, tolerating regular diet, passing flatus, voiding spontaneously, ambulating without difficulty. Denies heavy vaginal bleeding, CP/SOB, leadheadedness/dizziness, N/V.    O:  Vitals:   Vital Signs Last 24 Hrs  T(C): 36.6 (2024 06:02), Max: 37.8 (2024 10:00)  T(F): 97.8 (2024 06:02), Max: 100.1 (2024 10:00)  HR: 66 (2024 06:02) (66 - 105)  BP: 105/62 (2024 06:02) (105/62 - 123/75)  BP(mean): --  RR: 18 (2024 06:02) (18 - 18)  SpO2: 98% (2024 06:02) (98% - 100%)    Parameters below as of 2024 22:00  Patient On (Oxygen Delivery Method): room air        MEDICATIONS  (STANDING):  acetaminophen     Tablet .. 975 milliGRAM(s) Oral <User Schedule>  diphtheria/tetanus/pertussis (acellular) Vaccine (Adacel) 0.5 milliLiter(s) IntraMuscular once  ibuprofen  Tablet. 600 milliGRAM(s) Oral every 6 hours  influenza   Vaccine 0.5 milliLiter(s) IntraMuscular once  ketorolac   Injectable 30 milliGRAM(s) IV Push once  misoprostol 1000 MICROGram(s) Rectal once  oxytocin Infusion 333.333 milliUNIT(s)/Min (1000 mL/Hr) IV Continuous <Continuous>  oxytocin Infusion 41.667 milliUNIT(s)/Min (125 mL/Hr) IV Continuous <Continuous>  oxytocin Injectable 10 Unit(s) IntraMuscular once  prenatal multivitamin 1 Tablet(s) Oral daily  sodium chloride 0.9% lock flush 3 milliLiter(s) IV Push every 8 hours    MEDICATIONS  (PRN):  benzocaine 20%/menthol 0.5% Spray 1 Spray(s) Topical every 6 hours PRN for Perineal discomfort  dibucaine 1% Ointment 1 Application(s) Topical every 6 hours PRN Perineal discomfort  diphenhydrAMINE 25 milliGRAM(s) Oral every 6 hours PRN Pruritus  hydrocortisone 1% Cream 1 Application(s) Topical every 6 hours PRN Moderate Pain (4-6)  lanolin Ointment 1 Application(s) Topical every 6 hours PRN nipple soreness  magnesium hydroxide Suspension 30 milliLiter(s) Oral two times a day PRN Constipation  oxyCODONE    IR 5 milliGRAM(s) Oral every 3 hours PRN Moderate to Severe Pain (4-10)  oxyCODONE    IR 5 milliGRAM(s) Oral once PRN Moderate to Severe Pain (4-10)  pramoxine 1%/zinc 5% Cream 1 Application(s) Topical every 4 hours PRN Moderate Pain (4-6)  simethicone 80 milliGRAM(s) Chew every 4 hours PRN Gas  witch hazel Pads 1 Application(s) Topical every 4 hours PRN Perineal discomfort      Labs:  Blood type: O Negative  Rubella IgG: RPR: Negative                          8.9<L>   17.38<H> >-----------< 170    (  @ 06:03 )             28.6<L>                        10.0<L>   27.83<H> >-----------< 173    (  @ 05:15 )             30.9<L>                        11.0<L>   26.96<H> >-----------< 200    (  @ 22:29 )             34.5                  Physical Exam:  General: NAD  CV: RRR  Resp: CTAB  Abdomen: soft, non-tender, non-distended, fundus firm  : Nl lochia  Extremities: No erythema/edema

## 2024-04-07 NOTE — DISCHARGE NOTE OB - HOSPITAL COURSE
Patient was admitted to L+D for , Pt had an uncomplicated  followed by postpartum course complicated by endometritis. Patient received Zosyn and was afebrile for 24hrs, asymptomatic, and had downtrending WBC upon discharge.  EBL: 201  Hct: 34.5->30->28  On Postpartum day 2, patient was discharged home in stable condition, voiding spontaneously, pain well controlled, ambulating, tolerating PO and with normal vital signs. Patient declines postpartum birth control. Pt plans to follow up with Dr. Quick in 6 weeks. Telephone number and clinic information provided prior to discharge.  Patient was admitted to L+D for , Pt had a  c/b shoulder dystocia followed by postpartum course complicated by endometritis. Patient received Zosyn and was afebrile for 24hrs, asymptomatic, and had downtrending WBC upon discharge.  EBL: 201  Hct: 34.5->30->28  On Postpartum day 2, patient was discharged home in stable condition, voiding spontaneously, pain well controlled, ambulating, tolerating PO and with normal vital signs. Patient declines postpartum birth control. Pt plans to follow up with Dr. Quick in 6 weeks. Telephone number and clinic information provided prior to discharge.

## 2024-04-07 NOTE — PROGRESS NOTE ADULT - ATTENDING COMMENTS
Pt seen and examined.  Pt with no complaints at time of evaluation.  Pt denies HA, CP, SOB, calf pain, fevers/ chills.  Pt tolerating regular diet -N/-V, +flatus.  Pt reports good pain control with PO pain meds.  Voiding and ambulating without difficulty.  Pt reports moderate lochia.      ICU Vital Signs Last 24 Hrs  T(C): 36.6 (07 Apr 2024 13:41), Max: 37.4 (06 Apr 2024 18:00)  T(F): 97.8 (07 Apr 2024 13:41), Max: 99.4 (06 Apr 2024 18:00)  HR: 87 (07 Apr 2024 13:41) (66 - 104)  BP: 139/80 (07 Apr 2024 13:41) (105/62 - 139/80)  BP(mean): --  ABP: --  ABP(mean): --  RR: 18 (07 Apr 2024 13:41) (18 - 18)  SpO2: 100% (07 Apr 2024 13:41) (98% - 100%)    O2 Parameters below as of 06 Apr 2024 22:00  Patient On (Oxygen Delivery Method): room air    General: NAD  Abd: fundus firm nontender  Ext: no calf tenderness b/l       PPD#2 sp VD course complicated by shoulder dystocia and endometritis.    1.  Endometritis - sp Zosyn, pt afebrile > 24 hrs, fundus nontender, WBC downtrending 27-->17  2.  Pt doing well cleared for discharge home      Cora Fan MD

## 2024-04-07 NOTE — PROGRESS NOTE ADULT - ASSESSMENT
A/P: 29yo PPD#2 s/p  c/b endometritis.  Patient is stable and doing well post-partum.    - Pain well controlled, continue current pain regimen  - Increase ambulation, SCDs when not ambulating  - Continue regular diet  - Discharge planning     Endometritis  - T38.5  @ 00:007, s/p Zosyn  - WBC 26.9->27.8->17 today, downtrending  - Pt with nontender uterus on exam  - Will continue to monitor    Dameon Rome MD PGY1

## 2024-04-07 NOTE — DISCHARGE NOTE OB - PATIENT PORTAL LINK FT
You can access the FollowMyHealth Patient Portal offered by [facility] by registering at the following website: [email or web address]. By joining [facility]’s FollowMyHealth portal, you will also be able to view your health information using other applications (apps) compatible with our system.